# Patient Record
Sex: FEMALE | Race: WHITE | NOT HISPANIC OR LATINO
[De-identification: names, ages, dates, MRNs, and addresses within clinical notes are randomized per-mention and may not be internally consistent; named-entity substitution may affect disease eponyms.]

---

## 2019-01-02 PROBLEM — Z00.00 ENCOUNTER FOR PREVENTIVE HEALTH EXAMINATION: Status: ACTIVE | Noted: 2019-01-02

## 2019-01-11 ENCOUNTER — APPOINTMENT (OUTPATIENT)
Dept: GASTROENTEROLOGY | Facility: HOSPITAL | Age: 64
End: 2019-01-11

## 2022-02-01 NOTE — HISTORY OF PRESENT ILLNESS
[FreeTextEntry1] : 66 year F \par No prior colonoscopy\par Patient denies abdominal pain , n/v/heartburn, reflux, dysphagia/odynophagia, change in bowel habits, diarrhea, constipation, brbpr, melena. no weight loss.  Good appetite and energy level. Patient has daily BM, denies regular NSAID use. \par \par

## 2022-02-02 ENCOUNTER — APPOINTMENT (OUTPATIENT)
Dept: GASTROENTEROLOGY | Facility: CLINIC | Age: 67
End: 2022-02-02
Payer: MEDICARE

## 2022-02-02 VITALS
HEIGHT: 63 IN | SYSTOLIC BLOOD PRESSURE: 110 MMHG | WEIGHT: 110 LBS | DIASTOLIC BLOOD PRESSURE: 80 MMHG | BODY MASS INDEX: 19.49 KG/M2

## 2022-02-02 DIAGNOSIS — R05.3 CHRONIC COUGH: ICD-10-CM

## 2022-02-02 PROCEDURE — 99203 OFFICE O/P NEW LOW 30 MIN: CPT

## 2022-02-02 NOTE — PHYSICAL EXAM
[General Appearance - Alert] : alert [General Appearance - In No Acute Distress] : in no acute distress [Sclera] : the sclera and conjunctiva were normal [Outer Ear] : the ears and nose were normal in appearance [Hearing Threshold Finger Rub Not [place]] : hearing was normal [Neck Appearance] : the appearance of the neck was normal [] : no respiratory distress [Apical Impulse] : the apical impulse was normal [Abdomen Soft] : soft [Abdomen Tenderness] : non-tender [Abnormal Walk] : normal gait [Skin Color & Pigmentation] : normal skin color and pigmentation [Oriented To Time, Place, And Person] : oriented to person, place, and time [FreeTextEntry1] : deferred to colonoscopy

## 2022-02-02 NOTE — HISTORY OF PRESENT ILLNESS
[FreeTextEntry1] : 66 year old F htn, hld, lymphedema surgery, fam hx CRC (mother dx age 91) presents for evaluation of colon cancer screening\par \par she has a chronic dry cough and a lot of mucous and wakes up with coughing in the middle of the night x 1 year. \par 14 days of prilosec not helpful. \par \par Colonoscopy 01/22/2019- for screening\par showed 7 mm and 1 cm flat ascending colon polyps, path SSA/P, internal hemorrhoids\par \par prior EGD x2 - 5 years ago for reflux- in [facility], 1928[facility]1983- both normal. \par \par Patient denies abdominal pain , n/v/heartburn, reflux, dysphagia/odynophagia, change in bowel habits, diarrhea, constipation, brbpr, melena. no weight loss.  Good appetite.  Patient has daily BM, denies regular NSAID use. \par \par mother dx with crc at age 91. had surgery. \par \par PMD Dr. Addie Benavidez\par

## 2022-02-02 NOTE — ASSESSMENT
[FreeTextEntry1] : Due for colon cancer screening\par \par Risks (including but not limited to sedation risks, infection, bleeding, perforation, possibility of missed lesions), benefits and alternatives to procedure, including not doing the procedure, were discussed with patient. Patient understood and agreed to proceed with colonoscopy. \par Colonoscopy preparation instructions reviewed with patient.\par \par 2 Dulcolax two days prior to procedure + Split MiraLAX/Dulcolax preparation\par \par Chronic cough- unclear if due to GERD or other etiology, no response with PPI suggests alternative etiology\par -advised follow up with ENT\par -trial of Pepcid, gaviscon at night. \par \par

## 2022-02-11 ENCOUNTER — RESULT REVIEW (OUTPATIENT)
Age: 67
End: 2022-02-11

## 2022-02-13 ENCOUNTER — RESULT REVIEW (OUTPATIENT)
Age: 67
End: 2022-02-13

## 2022-02-14 ENCOUNTER — APPOINTMENT (OUTPATIENT)
Dept: GASTROENTEROLOGY | Facility: HOSPITAL | Age: 67
End: 2022-02-14
Payer: MEDICARE

## 2022-02-14 PROCEDURE — 45380 COLONOSCOPY AND BIOPSY: CPT

## 2023-09-20 ENCOUNTER — APPOINTMENT (OUTPATIENT)
Dept: GASTROENTEROLOGY | Facility: CLINIC | Age: 68
End: 2023-09-20
Payer: MEDICARE

## 2023-09-20 VITALS
DIASTOLIC BLOOD PRESSURE: 80 MMHG | BODY MASS INDEX: 19.49 KG/M2 | SYSTOLIC BLOOD PRESSURE: 110 MMHG | HEIGHT: 63 IN | WEIGHT: 110 LBS | HEART RATE: 65 BPM | OXYGEN SATURATION: 100 %

## 2023-09-20 DIAGNOSIS — Z12.11 ENCOUNTER FOR SCREENING FOR MALIGNANT NEOPLASM OF COLON: ICD-10-CM

## 2023-09-20 PROCEDURE — 99214 OFFICE O/P EST MOD 30 MIN: CPT

## 2023-09-20 RX ORDER — ATORVASTATIN CALCIUM 10 MG/1
10 TABLET, FILM COATED ORAL
Refills: 0 | Status: ACTIVE | COMMUNITY
Start: 2023-09-20

## 2023-09-20 RX ORDER — ESCITALOPRAM OXALATE 10 MG/1
10 TABLET, FILM COATED ORAL
Refills: 0 | Status: DISCONTINUED | COMMUNITY
End: 2023-09-20

## 2023-09-20 RX ORDER — ESCITALOPRAM OXALATE 5 MG/1
5 TABLET, FILM COATED ORAL
Refills: 0 | Status: DISCONTINUED | COMMUNITY
End: 2023-09-20

## 2023-09-20 RX ORDER — ROSUVASTATIN CALCIUM 5 MG/1
5 TABLET, FILM COATED ORAL
Refills: 0 | Status: DISCONTINUED | COMMUNITY
End: 2023-09-20

## 2023-09-20 RX ORDER — LISINOPRIL 5 MG/1
5 TABLET ORAL
Refills: 0 | Status: DISCONTINUED | COMMUNITY
End: 2023-09-20

## 2023-09-27 ENCOUNTER — RESULT REVIEW (OUTPATIENT)
Age: 68
End: 2023-09-27

## 2023-09-27 RX ORDER — SODIUM SULFATE, MAGNESIUM SULFATE, AND POTASSIUM CHLORIDE 17.75; 2.7; 2.25 G/1; G/1; G/1
1479-225-188 TABLET ORAL
Qty: 24 | Refills: 0 | Status: ACTIVE | COMMUNITY
Start: 2023-09-20 | End: 1900-01-01

## 2023-09-27 RX ORDER — SODIUM SULFATE, POTASSIUM SULFATE AND MAGNESIUM SULFATE 1.6; 3.13; 17.5 G/177ML; G/177ML; G/177ML
17.5-3.13-1.6 SOLUTION ORAL
Qty: 2 | Refills: 0 | Status: ACTIVE | COMMUNITY
Start: 2023-09-27 | End: 1900-01-01

## 2023-09-28 ENCOUNTER — RESULT REVIEW (OUTPATIENT)
Age: 68
End: 2023-09-28

## 2023-09-28 ENCOUNTER — APPOINTMENT (OUTPATIENT)
Dept: GASTROENTEROLOGY | Facility: HOSPITAL | Age: 68
End: 2023-09-28

## 2023-10-05 ENCOUNTER — TRANSCRIPTION ENCOUNTER (OUTPATIENT)
Age: 68
End: 2023-10-05

## 2023-10-06 DIAGNOSIS — E61.1 IRON DEFICIENCY: ICD-10-CM

## 2023-10-11 ENCOUNTER — LABORATORY RESULT (OUTPATIENT)
Age: 68
End: 2023-10-11

## 2023-10-17 ENCOUNTER — RESULT REVIEW (OUTPATIENT)
Age: 68
End: 2023-10-17

## 2023-11-17 ENCOUNTER — TRANSCRIPTION ENCOUNTER (OUTPATIENT)
Age: 68
End: 2023-11-17

## 2023-12-04 ENCOUNTER — APPOINTMENT (OUTPATIENT)
Dept: GASTROENTEROLOGY | Facility: HOSPITAL | Age: 68
End: 2023-12-04

## 2023-12-11 RX ORDER — OMEPRAZOLE 40 MG/1
40 CAPSULE, DELAYED RELEASE ORAL
Qty: 90 | Refills: 0 | Status: ACTIVE | COMMUNITY
Start: 2023-09-20 | End: 1900-01-01

## 2024-01-31 ENCOUNTER — APPOINTMENT (OUTPATIENT)
Dept: GASTROENTEROLOGY | Facility: CLINIC | Age: 69
End: 2024-01-31
Payer: MEDICARE

## 2024-01-31 VITALS
OXYGEN SATURATION: 98 % | HEART RATE: 69 BPM | WEIGHT: 110 LBS | SYSTOLIC BLOOD PRESSURE: 115 MMHG | BODY MASS INDEX: 19.49 KG/M2 | HEIGHT: 63 IN | DIASTOLIC BLOOD PRESSURE: 80 MMHG

## 2024-01-31 DIAGNOSIS — K22.4 DYSKINESIA OF ESOPHAGUS: ICD-10-CM

## 2024-01-31 DIAGNOSIS — K21.9 GASTRO-ESOPHAGEAL REFLUX DISEASE W/OUT ESOPHAGITIS: ICD-10-CM

## 2024-01-31 PROCEDURE — 99214 OFFICE O/P EST MOD 30 MIN: CPT

## 2024-02-03 NOTE — HISTORY OF PRESENT ILLNESS
[FreeTextEntry1] : 68 year old F htn, hld, lymphedema surgery, fam hx CRC (mother dx age 91) , osteoporosis on prolia, presents for follow up of GERD, iron deficiency.   10/2023:  EGD/Colonoscopy EGD path benign she still has coughing after eating and dyspepsia, no significant improvement with daily PPI. She will try adding pepcid GI PCR panel + for EPEC. mild colitis seen during colonoscopy. patient without symptoms. will defer treatment at this time. recent labs she reports show ferritin stable at 24 , (24 in 07/2023) on QOD iron supplement.  CTE recommended  CTE: IMPRESSION:  No definite bowel wall thickening or abnormal bowel wall enhancement. No definite discrete small large bowel lesions.   VCE: prolonged passage time from esophagus to stomach suggestive of dymotility, single small ulceration seen in mid small bowel. esophagram and esophageal manometry recommended.  patient declined esophagram/  reports prior motility testing at [facility].  review of [facility] records- eval for chronic cough 06/2016 by GI and ENT  ENT eval- chronic rhinitis ,left vocal fold paresis raises possibility of neurogenic cough  trial of atrovent, if not helpful trial of gabapentin   MBS- no penetration of aspiration with any tested consistency.   EGD 2015 placed on preacivd- had diarrhea so stopped, switched to nexium bid and zantac- had m weakness, bladded infection and rash.  EGD 03/2016 to rule out BE after BID PPI 5 mm esophageal islan, active esophagitis with inc eos, gastric mucosa and mild chronic inflammation, no BE. 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 immunodeficiency, previously saw rheumatology-its a chronic issue and told nothing to do about it.  ferritin 2-->20  after being on po iron, takes it every other day, retested after 3 months, then told to stay on additional 6 months.  was having either constipation or diarrhea, stomach cramping after taking iron -now used to it.  does not eat meat. no brbpr/melena. no n/v/. no hematuria  chronic cough and reflux, for years. not at night. starts to cough after eating.  no hb,  takes omeprazole prn for regurgitation/reflux ,  if she doesn't take it, she feels things building up, then takes a couple of days of taking ppi before she feels better.   Colonoscopy 02/2022 benign subcentimeter colon polyps TA and nonadenomatous, recall for colonoscopy in 5 years  Colonoscopy 01/22/2019- for screening showed 7 mm and 1 cm flat ascending colon polyps, path SSA/P, internal hemorrhoids  prior EGD x2 - 5 years ago for reflux- in [place]-initially showed esophagitis, then the second one was normal.    Patient denies abdominal pain , n/v dysphagia/odynophagia, change in bowel habits, diarrhea, constipation, brbpr, melena. no weight loss.  Good appetite.  Patient has daily BM, denies regular NSAID use.   meds- lisinopril, atorvastatin  mother dx with crc at age 91. had surgery.   PMD Dr. Addie Benavidez

## 2024-02-03 NOTE — ASSESSMENT
[FreeTextEntry1] : GERD with reflux/cough- review of prior records indicate symptoms chronic for years  suspect esophageal dysmotility- achalsaia vs. EGJOO-based on prolonged passage of VCE from esophagus into stomach.  - recommend esophagram and motility testing- manometry vs. endoflip - ok to dc ppi-no change in symptoms . cont H2B at night trial of gaviscon advance liquid for symptom improvement.   -small bites, chew well, soft diet -patient declining testing at this time of esophageal dysmotility.  -patient overwhelmed with testing for other medical issues-(seeing neuro for balance issues). reviewed potential risk of worsening motility, food impaction, aspiration.  instructed her to call if worsening.   Iron def-  -EGD/Colonoscopy unrevealing. VCE unrevealing-  cont f/u with hematology

## 2024-02-03 NOTE — PHYSICAL EXAM
[General Appearance - Alert] : alert [Sclera] : the sclera and conjunctiva were normal [General Appearance - In No Acute Distress] : in no acute distress [Outer Ear] : the ears and nose were normal in appearance [Hearing Threshold Finger Rub Not [place]] : hearing was normal [Neck Appearance] : the appearance of the neck was normal [] : no respiratory distress [Apical Impulse] : the apical impulse was normal [Abdomen Soft] : soft [Abdomen Tenderness] : non-tender [Abnormal Walk] : normal gait [Skin Color & Pigmentation] : normal skin color and pigmentation [Oriented To Time, Place, And Person] : oriented to person, place, and time [FreeTextEntry1] : deferred to colonoscopy

## 2024-02-15 ENCOUNTER — RESULT REVIEW (OUTPATIENT)
Age: 69
End: 2024-02-15

## 2024-05-09 ENCOUNTER — RX RENEWAL (OUTPATIENT)
Age: 69
End: 2024-05-09

## 2024-05-09 RX ORDER — FAMOTIDINE 20 MG/1
20 TABLET, FILM COATED ORAL DAILY
Qty: 90 | Refills: 2 | Status: ACTIVE | COMMUNITY
Start: 2023-10-05 | End: 1900-01-01

## 2025-02-06 ENCOUNTER — RX RENEWAL (OUTPATIENT)
Age: 70
End: 2025-02-06